# Patient Record
Sex: FEMALE | Race: WHITE | NOT HISPANIC OR LATINO | Employment: UNEMPLOYED | URBAN - METROPOLITAN AREA
[De-identification: names, ages, dates, MRNs, and addresses within clinical notes are randomized per-mention and may not be internally consistent; named-entity substitution may affect disease eponyms.]

---

## 2017-12-07 ENCOUNTER — APPOINTMENT (OUTPATIENT)
Dept: WOMENS IMAGING | Facility: HOSPITAL | Age: 52
End: 2017-12-07

## 2017-12-07 PROCEDURE — 77067 SCR MAMMO BI INCL CAD: CPT | Performed by: RADIOLOGY

## 2017-12-07 PROCEDURE — 77063 BREAST TOMOSYNTHESIS BI: CPT | Performed by: RADIOLOGY

## 2018-11-05 ENCOUNTER — HOSPITAL ENCOUNTER (EMERGENCY)
Facility: HOSPITAL | Age: 53
Discharge: HOME | End: 2018-11-05
Attending: EMERGENCY MEDICINE | Admitting: EMERGENCY MEDICINE

## 2018-11-05 ENCOUNTER — APPOINTMENT (OUTPATIENT)
Dept: GENERAL RADIOLOGY | Facility: HOSPITAL | Age: 53
End: 2018-11-05

## 2018-11-05 VITALS
BODY MASS INDEX: 24.99 KG/M2 | HEIGHT: 65 IN | DIASTOLIC BLOOD PRESSURE: 83 MMHG | TEMPERATURE: 98.1 F | WEIGHT: 150 LBS | SYSTOLIC BLOOD PRESSURE: 124 MMHG | HEART RATE: 84 BPM | OXYGEN SATURATION: 99 % | RESPIRATION RATE: 18 BRPM

## 2018-11-05 DIAGNOSIS — R07.9 CHEST PAIN, UNSPECIFIED TYPE: Primary | ICD-10-CM

## 2018-11-05 LAB
ALBUMIN SERPL-MCNC: 4.5 G/DL (ref 3.5–5.2)
ALBUMIN/GLOB SERPL: 1.6 G/DL
ALP SERPL-CCNC: 58 U/L (ref 39–117)
ALT SERPL W P-5'-P-CCNC: 24 U/L (ref 1–33)
ANION GAP SERPL CALCULATED.3IONS-SCNC: 9.9 MMOL/L
AST SERPL-CCNC: 21 U/L (ref 1–32)
BASOPHILS # BLD AUTO: 0.04 10*3/MM3 (ref 0–0.2)
BASOPHILS NFR BLD AUTO: 0.6 % (ref 0–1.5)
BILIRUB SERPL-MCNC: 0.4 MG/DL (ref 0.1–1.2)
BUN BLD-MCNC: 19 MG/DL (ref 6–20)
BUN/CREAT SERPL: 26 (ref 7–25)
CALCIUM SPEC-SCNC: 9.8 MG/DL (ref 8.6–10.5)
CHLORIDE SERPL-SCNC: 102 MMOL/L (ref 98–107)
CO2 SERPL-SCNC: 29.1 MMOL/L (ref 22–29)
CREAT BLD-MCNC: 0.73 MG/DL (ref 0.57–1)
DEPRECATED RDW RBC AUTO: 42.8 FL (ref 37–54)
EOSINOPHIL # BLD AUTO: 0.05 10*3/MM3 (ref 0–0.7)
EOSINOPHIL NFR BLD AUTO: 0.8 % (ref 0.3–6.2)
ERYTHROCYTE [DISTWIDTH] IN BLOOD BY AUTOMATED COUNT: 13.3 % (ref 11.7–13)
GFR SERPL CREATININE-BSD FRML MDRD: 83 ML/MIN/1.73
GLOBULIN UR ELPH-MCNC: 2.8 GM/DL
GLUCOSE BLD-MCNC: 101 MG/DL (ref 65–99)
HCT VFR BLD AUTO: 41.1 % (ref 35.6–45.5)
HGB BLD-MCNC: 13.4 G/DL (ref 11.9–15.5)
IMM GRANULOCYTES # BLD: 0.02 10*3/MM3 (ref 0–0.03)
IMM GRANULOCYTES NFR BLD: 0.3 % (ref 0–0.5)
LYMPHOCYTES # BLD AUTO: 1.69 10*3/MM3 (ref 0.9–4.8)
LYMPHOCYTES NFR BLD AUTO: 26.4 % (ref 19.6–45.3)
MCH RBC QN AUTO: 28.8 PG (ref 26.9–32)
MCHC RBC AUTO-ENTMCNC: 32.6 G/DL (ref 32.4–36.3)
MCV RBC AUTO: 88.4 FL (ref 80.5–98.2)
MONOCYTES # BLD AUTO: 0.39 10*3/MM3 (ref 0.2–1.2)
MONOCYTES NFR BLD AUTO: 6.1 % (ref 5–12)
NEUTROPHILS # BLD AUTO: 4.23 10*3/MM3 (ref 1.9–8.1)
NEUTROPHILS NFR BLD AUTO: 66.1 % (ref 42.7–76)
PLATELET # BLD AUTO: 197 10*3/MM3 (ref 140–500)
PMV BLD AUTO: 11.5 FL (ref 6–12)
POTASSIUM BLD-SCNC: 3.9 MMOL/L (ref 3.5–5.2)
PROT SERPL-MCNC: 7.3 G/DL (ref 6–8.5)
RBC # BLD AUTO: 4.65 10*6/MM3 (ref 3.9–5.2)
SODIUM BLD-SCNC: 141 MMOL/L (ref 136–145)
TROPONIN T SERPL-MCNC: <0.01 NG/ML (ref 0–0.03)
WBC NRBC COR # BLD: 6.4 10*3/MM3 (ref 4.5–10.7)

## 2018-11-05 PROCEDURE — 93010 ELECTROCARDIOGRAM REPORT: CPT | Performed by: INTERNAL MEDICINE

## 2018-11-05 PROCEDURE — 80053 COMPREHEN METABOLIC PANEL: CPT | Performed by: NURSE PRACTITIONER

## 2018-11-05 PROCEDURE — 93005 ELECTROCARDIOGRAM TRACING: CPT | Performed by: EMERGENCY MEDICINE

## 2018-11-05 PROCEDURE — 85025 COMPLETE CBC W/AUTO DIFF WBC: CPT | Performed by: NURSE PRACTITIONER

## 2018-11-05 PROCEDURE — 84484 ASSAY OF TROPONIN QUANT: CPT | Performed by: NURSE PRACTITIONER

## 2018-11-05 PROCEDURE — 93005 ELECTROCARDIOGRAM TRACING: CPT

## 2018-11-05 PROCEDURE — 71045 X-RAY EXAM CHEST 1 VIEW: CPT

## 2018-11-05 PROCEDURE — 99283 EMERGENCY DEPT VISIT LOW MDM: CPT

## 2018-11-05 NOTE — ED PROVIDER NOTES
" EMERGENCY DEPARTMENT ENCOUNTER    CHIEF COMPLAINT  Chief Complaint: Chest Pain  History given by: Patient  History limited by: none  Room Number: 34/34  PMD: Sid Turcios MD      HPI:  Pt is a 52 y.o. female who presents complaining of constant, non-radiating left sternal border CP that began at  yesterday. Pt reports pain was moderate last night but has eased with Ibuprofen this morning. Pt describes the pain between \"dull\" and \"throbbing\". Pt denies any aggravating factors. Pt denies fever, HA, cough, SOA, abd pain, N/V/D, burning urination, leg pain or swelling. Pt denies Hx of stress test.     Duration/Onset/Timin.5 hours, gradual, constant  Location: left to sternal border  Radiation: none   Quality: between \"throbbing\" and \"dull\"  Intensity/Severity: mild  Associated Symptoms: none  Aggravating or Alleviating Factors: no aggravating, mild relief with Ibuprofen  Previous Episodes: none      PAST MEDICAL HISTORY  Active Ambulatory Problems     Diagnosis Date Noted   • No Active Ambulatory Problems     Resolved Ambulatory Problems     Diagnosis Date Noted   • No Resolved Ambulatory Problems     Past Medical History:   Diagnosis Date   • Migraine        PAST SURGICAL HISTORY  Past Surgical History:   Procedure Laterality Date   • BREAST SURGERY      augmentation       FAMILY HISTORY  History reviewed. No pertinent family history.    SOCIAL HISTORY  Social History     Social History   • Marital status:      Spouse name: N/A   • Number of children: N/A   • Years of education: N/A     Occupational History   • Not on file.     Social History Main Topics   • Smoking status: Never Smoker   • Smokeless tobacco: Not on file   • Alcohol use No   • Drug use: Unknown   • Sexual activity: Defer     Other Topics Concern   • Not on file     Social History Narrative   • No narrative on file       ALLERGIES  Codeine    REVIEW OF SYSTEMS  Review of Systems   Constitutional: Negative.  Negative for fever. "   HENT: Negative.  Negative for sore throat.    Eyes: Negative.    Respiratory: Negative.  Negative for cough and shortness of breath.    Cardiovascular: Positive for chest pain (left sternal border). Negative for leg swelling.   Gastrointestinal: Negative.  Negative for diarrhea, nausea and vomiting.   Genitourinary: Negative.  Negative for dysuria.   Musculoskeletal: Negative.  Negative for back pain and myalgias (leg).   Skin: Negative.  Negative for rash.   Neurological: Negative.  Negative for headaches.   All other systems reviewed and are negative.      PHYSICAL EXAM  ED Triage Vitals   Temp Heart Rate Resp BP SpO2   11/05/18 1219 11/05/18 1219 11/05/18 1219 11/05/18 1256 11/05/18 1219   98.1 °F (36.7 °C) 84 18 124/83 99 %      Temp src Heart Rate Source Patient Position BP Location FiO2 (%)   11/05/18 1219 11/05/18 1219 11/05/18 1256 11/05/18 1256 --   Tympanic Monitor Sitting Right arm        Physical Exam   Constitutional: No distress.   HENT:   Head: Normocephalic and atraumatic.   Eyes: Conjunctivae are normal.   Neck: Normal range of motion.   Cardiovascular: Normal rate and regular rhythm.   Pulmonary/Chest: Breath sounds normal. No respiratory distress.   Abdominal: There is no tenderness.   Musculoskeletal: She exhibits no edema or tenderness.   Neurological: She is alert.   Skin: No rash noted.   Nursing note and vitals reviewed.      LAB RESULTS  Lab Results (last 24 hours)     Procedure Component Value Units Date/Time    CBC & Differential [[id number]] Collected:  11/05/18 1253    Specimen:  Blood Updated:  11/05/18 1320    Narrative:       The following orders were created for panel order CBC & Differential.  Procedure                               Abnormality         Status                     ---------                               -----------         ------                     CBC Auto Differential[[id number]]         Abnormal            Final result                 Please view results for these  tests on the individual orders.    Comprehensive Metabolic Panel [[id number]]  (Abnormal) Collected:  11/05/18 1253    Specimen:  Blood Updated:  11/05/18 1334     Glucose 101 (H) mg/dL      BUN 19 mg/dL      Creatinine 0.73 mg/dL      Sodium 141 mmol/L      Potassium 3.9 mmol/L      Chloride 102 mmol/L      CO2 29.1 (H) mmol/L      Calcium 9.8 mg/dL      Total Protein 7.3 g/dL      Albumin 4.50 g/dL      ALT (SGPT) 24 U/L      AST (SGOT) 21 U/L      Alkaline Phosphatase 58 U/L      Total Bilirubin 0.4 mg/dL      eGFR Non African Amer 83 mL/min/1.73      Globulin 2.8 gm/dL      A/G Ratio 1.6 g/dL      BUN/Creatinine Ratio 26.0 (H)     Anion Gap 9.9 mmol/L     Troponin [[id number]]  (Normal) Collected:  11/05/18 1253    Specimen:  Blood Updated:  11/05/18 1334     Troponin T <0.010 ng/mL     Narrative:       Troponin T Reference Ranges:  Less than 0.03 ng/mL:    Negative for AMI  0.03 to 0.09 ng/mL:      Indeterminant for AMI  Greater than 0.09 ng/mL: Positive for AMI    CBC Auto Differential [[id number]]  (Abnormal) Collected:  11/05/18 1253    Specimen:  Blood Updated:  11/05/18 1320     WBC 6.40 10*3/mm3      RBC 4.65 10*6/mm3      Hemoglobin 13.4 g/dL      Hematocrit 41.1 %      MCV 88.4 fL      MCH 28.8 pg      MCHC 32.6 g/dL      RDW 13.3 (H) %      RDW-SD 42.8 fl      MPV 11.5 fL      Platelets 197 10*3/mm3      Neutrophil % 66.1 %      Lymphocyte % 26.4 %      Monocyte % 6.1 %      Eosinophil % 0.8 %      Basophil % 0.6 %      Immature Grans % 0.3 %      Neutrophils, Absolute 4.23 10*3/mm3      Lymphocytes, Absolute 1.69 10*3/mm3      Monocytes, Absolute 0.39 10*3/mm3      Eosinophils, Absolute 0.05 10*3/mm3      Basophils, Absolute 0.04 10*3/mm3      Immature Grans, Absolute 0.02 10*3/mm3           I ordered the above labs and reviewed the results    RADIOLOGY  XR Chest 1 View   Final Result   No focal pulmonary consolidation. Follow-up as clinical   indications persist.       This report was finalized on  11/5/2018 1:15 PM by Dr. Dale Varghese M.D.               I ordered the above noted radiological studies. Interpreted by radiologist. Reviewed by me in PACS.       PROCEDURES  Procedures  EKG          EKG time: 1217  Rhythm/Rate: NSR, 70  P waves and IL: LAE  QRS, axis: Nml   ST and T waves: inverted t waves anteriorly     Interpreted Contemporaneously by me, independently viewed  No prior for comparison.       PROGRESS AND CONSULTS  ED Course as of Nov 05 1510   Mon Nov 05, 2018   1246 Cp consistent since 8pm  [KG]   1441 HEART score 2 - will contact Dr Turcios.  Pt is low risk coronary - likely outpt FU.  [DB]      ED Course User Index  [DB] Lucho Springer MD  [KG] Misti Schulte, THOMAS     HEART score = 2    1437  Initial encounter. Pt is resting comfortably. Notified pt of EKG results, unremarkable lab work, including troponin, and negative CXR. Discussed the plan to further evaluate with Cardiology. Pt understands and agrees with the plan, all questions answered.    1449  Discussed the pt's case with Dr. Turcios, Family Medicine.    1505  Rechecked pt. Pt is resting comfortably. Notified pt of discussion with Dr. Turcios. Discussed the plan to discharge the pt home. I instructed the pt to f/u with PCP and Cardiology. Pt understands and agrees with the plan, all questions answered.    I have reviewed with the patient the findings of the EKG and labs.  We have discussed that the diagnosis of chest pain is difficult and inexact at times.  We discussed inpatient vs outpatient workup and have decided on further workup as an outpatient.  It is understood that the current evaluation does not rule out 100% a serious cardiac etiology.  I have stressed the importance to return to the ER for worsening symptoms and the need to follow up promptly with the referrals I have given.      MEDICAL DECISION MAKING  Results were reviewed/discussed with the patient and they were also made aware of online access. Pt also made  aware that some labs, such as cultures, will not be resulted during ER visit and follow up with PMD is necessary.     MDM  Number of Diagnoses or Management Options  Chest pain, unspecified type:      Amount and/or Complexity of Data Reviewed  Clinical lab tests: reviewed and ordered (Lab work is unremarkable.)  Tests in the radiology section of CPT®: reviewed and ordered (CXR negative. )  Tests in the medicine section of CPT®: reviewed and ordered (See EKG results in procedure.)  Discuss the patient with other providers: yes (Dr. Turcios)           DIAGNOSIS  Final diagnoses:   Chest pain, unspecified type       DISPOSITION  DISCHARGE    Patient discharged in stable condition.    Reviewed implications of results, diagnosis, meds, responsibility to follow up, warning signs and symptoms of possible worsening, potential complications and reasons to return to ER.    Patient/Family voiced understanding of above instructions.    Discussed plan for discharge, as there is no emergent indication for admission. Patient referred to primary care provider for BP management due to today's BP. Pt/family is agreeable and understands need for follow up and repeat testing.  Pt is aware that discharge does not mean that nothing is wrong but it indicates no emergency is present that requires admission and they must continue care with follow-up as given below or physician of their choice.     FOLLOW-UP  Sid Turcios MD  [address] [facility] [address] [phone]      Call for Appointment    [facility] CARDIOLOGY  [address]  [place] [address] [phone]    Call for Appointment         Medication List      No changes were made to your prescriptions during this visit.           Latest Documented Vital Signs:  As of 3:10 PM  BP- 124/83 HR- 84 Temp- 98.1 °F (36.7 °C) (Tympanic) O2 sat- 99%    --  Documentation assistance provided by yelitza Klein for Dr. Springer.   Information recorded by the scribe was done at my direction and has been verified and validated by me.                Lul Klein  11/05/18 1510       Lucho Springer MD  11/05/18 1518    Chart addendum- please note physical exam was added from my recollection of patient at a later date, that being 4/25/2019.       Lucho Springer MD  04/25/19 0805

## 2018-12-11 ENCOUNTER — APPOINTMENT (OUTPATIENT)
Dept: WOMENS IMAGING | Facility: HOSPITAL | Age: 53
End: 2018-12-11

## 2018-12-11 PROCEDURE — 77067 SCR MAMMO BI INCL CAD: CPT | Performed by: RADIOLOGY

## 2018-12-11 PROCEDURE — 77063 BREAST TOMOSYNTHESIS BI: CPT | Performed by: RADIOLOGY

## 2019-08-05 ENCOUNTER — CONSULT (OUTPATIENT)
Dept: ORTHOPEDIC SURGERY | Facility: CLINIC | Age: 54
End: 2019-08-05

## 2019-08-05 VITALS — HEIGHT: 65 IN | WEIGHT: 155 LBS | BODY MASS INDEX: 25.83 KG/M2

## 2019-08-05 DIAGNOSIS — M25.562 ACUTE PAIN OF LEFT KNEE: Primary | ICD-10-CM

## 2019-08-05 DIAGNOSIS — M17.12 PRIMARY LOCALIZED OSTEOARTHROSIS OF LEFT LOWER LEG: ICD-10-CM

## 2019-08-05 PROCEDURE — 99203 OFFICE O/P NEW LOW 30 MIN: CPT | Performed by: ORTHOPAEDIC SURGERY

## 2019-08-05 PROCEDURE — 20610 DRAIN/INJ JOINT/BURSA W/O US: CPT | Performed by: ORTHOPAEDIC SURGERY

## 2019-08-05 PROCEDURE — 73562 X-RAY EXAM OF KNEE 3: CPT | Performed by: ORTHOPAEDIC SURGERY

## 2019-08-05 RX ORDER — MELOXICAM 15 MG/1
TABLET ORAL
Qty: 30 TABLET | Refills: 0 | Status: SHIPPED | OUTPATIENT
Start: 2019-08-05 | End: 2022-05-02 | Stop reason: ALTCHOICE

## 2019-08-05 RX ADMIN — LIDOCAINE HYDROCHLORIDE 4 ML: 20 INJECTION, SOLUTION EPIDURAL; INFILTRATION; INTRACAUDAL; PERINEURAL at 15:49

## 2019-08-05 RX ADMIN — METHYLPREDNISOLONE ACETATE 80 MG: 80 INJECTION, SUSPENSION INTRA-ARTICULAR; INTRALESIONAL; INTRAMUSCULAR; SOFT TISSUE at 15:49

## 2019-08-05 NOTE — PROGRESS NOTES
New Left Knee      Patient: Vira Lares        YOB: 1965    Medical Record Number: [id number]        Chief Complaints: left knee pain  Chief Complaint   Patient presents with   • Left Knee - Establish Care         History of Present Illness: This is a 53-year-old female who is several years status post left knee arthroscopy at which time I had to do a near subtotal meniscectomy for a large root tear she said she is actually done quite well she is able to walk however recently her last 6 to 12 months has noted some achiness in the left knee no history injury change in activity symptoms are moderate intermittent grinding with some swelling worse with walking somewhat better with 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     No current facility-administered medications on file prior to visit.      Current Medications:  Scheduled Meds:  Continuous Infusions:  No current facility-administered medications for this visit.   PRN Meds:.    Past Medical History:   Diagnosis Date   • Migraine         Past Surgical History:   Procedure Laterality Date   • BREAST SURGERY      augmentation   • KNEE SURGERY          Social History     Occupational History   • Not on file   Tobacco Use   • Smoking status: Never Smoker   Substance and Sexual Activity   • Alcohol use: No   • Drug use: Defer   • Sexual activity: Defer      Social History     Social History Narrative   • Not on file      No family history on file.          Review of Systems: 14 point review of systems are remarkable for the pertinent positives listed in the chart by the patient the remainder negative    Review of Systems      Physical Exam: 53 y.o. female  General Appearance:    " Alert, cooperative, in no acute distress                   Vitals:    08/05/19 1527   Weight: 70.3 kg (155 lb)   Height: 165.1 cm (65\")      Patient is alert and read ×3 no acute distress appears her above-listed at height weight and age.  Affect is normal respiratory rate is normal unlabored. Heart rate regular rate rhythm, sclera, dentition and hearing are normal for the purpose of this exam.        Ortho Exam Physical exam of the left knee reveals no effusion, no erythema.  It mild loss of extension and full flexion  Patient has mild varus alignment.  They have mild tenderness to palpation about the medial compartment, no tenderness laterally..  The patient has a negative bounce home, negative Jacky and a stable ligamentous exam.  Quad tone is reasonable and symmetric.  There are no overlying skin changes no lymphedema no lymphadenopathy.  There is good hip range of motion which is full symmetric and asymptomatic and a normal ankle exam.        Large Joint Arthrocentesis: L knee  Date/Time: 8/5/2019 3:49 PM  Consent given by: patient  Site marked: site marked  Timeout: Immediately prior to procedure a time out was called to verify the correct patient, procedure, equipment, support staff and site/side marked as required   Supporting Documentation  Indications: pain   Procedure Details  Location: knee - L knee  Preparation: Patient was prepped and draped in the usual sterile fashion  Needle size: 22 G  Approach: anteromedial  Medications administered: 80 mg methylPREDNISolone acetate 80 MG/ML; 4 mL lidocaine PF 2% 2 %  Patient tolerance: patient tolerated the procedure well with no immediate complications                   Radiology:   AP, Lateral and merchant views of the left knee  were ordered/reviewed to evauateknee pain.  I have no compared to films readily available she does have some narrowing of her left medial compartment about 50% of the opposite side no acute pathology  Imaging Results (most recent)  "    Procedure Component Value Units Date/Time    XR Knee 3 View Left [[id number]] Resulted:  08/05/19 1516     Updated:  08/05/19 1517    Impression:       Ordering physician's impression is located in the Encounter Note dated 08/05/19. X-ray performed in the DR room.          Assessment/Plan:        Left knee pain with some narrowing of her medial compartment.  Quite honestly I am surprised her narrowing was is not worse based on how much meniscus I had to remove I think she benefit from an injection and continued quad and core strengthening she fails to improve we would pursue other means of testing therefore the injection is diagnostic and therapeutic

## 2019-08-09 RX ORDER — METHYLPREDNISOLONE ACETATE 80 MG/ML
80 INJECTION, SUSPENSION INTRA-ARTICULAR; INTRALESIONAL; INTRAMUSCULAR; SOFT TISSUE
Status: COMPLETED | OUTPATIENT
Start: 2019-08-05 | End: 2019-08-05

## 2019-08-09 RX ORDER — LIDOCAINE HYDROCHLORIDE 20 MG/ML
4 INJECTION, SOLUTION EPIDURAL; INFILTRATION; INTRACAUDAL; PERINEURAL
Status: COMPLETED | OUTPATIENT
Start: 2019-08-05 | End: 2019-08-05

## 2019-12-12 ENCOUNTER — APPOINTMENT (OUTPATIENT)
Dept: WOMENS IMAGING | Facility: HOSPITAL | Age: 54
End: 2019-12-12

## 2019-12-12 PROCEDURE — 77067 SCR MAMMO BI INCL CAD: CPT | Performed by: RADIOLOGY

## 2019-12-12 PROCEDURE — 77063 BREAST TOMOSYNTHESIS BI: CPT | Performed by: RADIOLOGY

## 2021-04-06 ENCOUNTER — APPOINTMENT (OUTPATIENT)
Dept: WOMENS IMAGING | Facility: HOSPITAL | Age: 56
End: 2021-04-06

## 2021-04-06 PROCEDURE — 77067 SCR MAMMO BI INCL CAD: CPT | Performed by: RADIOLOGY

## 2021-04-06 PROCEDURE — 77063 BREAST TOMOSYNTHESIS BI: CPT | Performed by: RADIOLOGY

## 2022-05-02 ENCOUNTER — OFFICE VISIT (OUTPATIENT)
Dept: CARDIOLOGY | Facility: CLINIC | Age: 57
End: 2022-05-02

## 2022-05-02 VITALS
DIASTOLIC BLOOD PRESSURE: 80 MMHG | HEART RATE: 77 BPM | BODY MASS INDEX: 25.22 KG/M2 | WEIGHT: 151.4 LBS | HEIGHT: 65 IN | SYSTOLIC BLOOD PRESSURE: 130 MMHG

## 2022-05-02 DIAGNOSIS — I44.1 SYMPTOMATIC ADVANCED HEART BLOCK: Primary | ICD-10-CM

## 2022-05-02 PROCEDURE — 99204 OFFICE O/P NEW MOD 45 MIN: CPT | Performed by: INTERNAL MEDICINE

## 2022-05-02 NOTE — PROGRESS NOTES
"      CARDIOLOGY    Fermin Cantor MD    ENCOUNTER DATE:  05/02/2022    Vira Lares / 56 y.o. / female        CHIEF COMPLAINT / REASON FOR OFFICE VISIT     Abnormal stress test      HISTORY OF PRESENT ILLNESS       HPI  Vira Lares is a 56 y.o. female who presents today for consultation.  Patient had an episode where she was walking up a hill and she suddenly got very short of breath and was extremely exhausted.  Patient types of chest discomfort.  Patient says that when she exercises vigorously she will get some lightheadedness and is occasional \"spells.  She underwent a stress test in your office she walked 12 minutes with no chest discomfort or ST changes.  Patient however developed a bradycardia during exercise.  It reproduced all of her symptoms that she was having when she just got short of breath extremely exhausted.      The following portions of the patient's history were reviewed and updated as appropriate: allergies, current medications, past family history, past medical history, past social history, past surgical history and problem list.      VITAL SIGNS     Visit Vitals  /80 (BP Location: Left arm)   Pulse 77   Ht 165.1 cm (65\")   Wt 68.7 kg (151 lb 6.4 oz)   BMI 25.19 kg/m²         Wt Readings from Last 3 Encounters:   05/02/22 68.7 kg (151 lb 6.4 oz)   08/05/19 70.3 kg (155 lb)   11/05/18 68 kg (150 lb)     Body mass index is 25.19 kg/m².      REVIEW OF SYSTEMS   Review of Systems   All other systems reviewed and are negative.          PHYSICAL EXAMINATION     Vitals reviewed.   Constitutional:       Appearance: Healthy appearance.   Pulmonary:      Effort: Pulmonary effort is normal.   Cardiovascular:      Normal rate. Regular rhythm. Normal S1. Normal S2.      Murmurs: There is no murmur.      No gallop. No click. No rub.   Pulses:     Intact distal pulses.   Edema:     Peripheral edema absent.   Neurological:      Mental Status: Alert and oriented to person, place and time. "           REVIEWED DATA     Procedures    Cardiac Procedures:  1.           ASSESSMENT & PLAN      Diagnosis Plan   1. Symptomatic advanced heart block           SUMMARY/DISCUSSION  1. Patient was found to have exercise-induced heart block on a stress test.  Patient had an echocardiogram that was normal.  She had trace much regurgitation and ejection fraction measured at 67%.  Patient is very active she has no other symptoms and she has no past medical history.  With that it appears she has exercise-induced heart block.  We will set her up to get a pacemaker with Dr. Sid Keller.  We will retry for next Tuesday if it is okay with her schedule.        MEDICATIONS         Discharge Medications      as of May 2, 2022 12:54 PM     Patient Not Prescribed Medications Upon Discharge             **Dragon Disclaimer:   Much of this encounter note is an electronic transcription/translation of spoken language to printed text. The electronic translation of spoken language may permit erroneous, or at times, nonsensical words or phrases to be inadvertently transcribed. Although I have reviewed the note for such errors, some may still exist.

## 2022-05-04 DIAGNOSIS — I44.1 AV BLOCK, MOBITZ II: Primary | ICD-10-CM

## 2022-05-13 ENCOUNTER — TRANSCRIBE ORDERS (OUTPATIENT)
Dept: CARDIOLOGY | Facility: CLINIC | Age: 57
End: 2022-05-13

## 2022-05-13 DIAGNOSIS — Z01.810 PREPROCEDURAL CARDIOVASCULAR EXAMINATION: ICD-10-CM

## 2022-05-13 DIAGNOSIS — Z13.6 SCREENING FOR CARDIOVASCULAR CONDITION: Primary | ICD-10-CM

## 2022-05-23 ENCOUNTER — HOSPITAL ENCOUNTER (OUTPATIENT)
Dept: MRI IMAGING | Facility: HOSPITAL | Age: 57
Discharge: HOME | End: 2022-05-23
Admitting: INTERNAL MEDICINE

## 2022-05-23 PROCEDURE — 75561 CARDIAC MRI FOR MORPH W/DYE: CPT

## 2022-05-23 PROCEDURE — 75561 CARDIAC MRI FOR MORPH W/DYE: CPT | Performed by: INTERNAL MEDICINE

## 2022-05-23 PROCEDURE — 0 GADOBENATE DIMEGLUMINE 529 MG/ML SOLUTION: Performed by: INTERNAL MEDICINE

## 2022-05-23 PROCEDURE — A9577 INJ MULTIHANCE: HCPCS | Performed by: INTERNAL MEDICINE

## 2022-05-23 RX ADMIN — GADOBENATE DIMEGLUMINE 13 ML: 529 INJECTION, SOLUTION INTRAVENOUS at 10:36

## 2022-05-27 NOTE — PROGRESS NOTES
I left her a voicemail with these results and still recommended a pacemaker.  I also left a voicemail for her internal medicine physician Dr. Sid Turcios

## 2022-07-22 ENCOUNTER — APPOINTMENT (OUTPATIENT)
Dept: WOMENS IMAGING | Facility: HOSPITAL | Age: 57
End: 2022-07-22

## 2022-07-22 PROCEDURE — 77063 BREAST TOMOSYNTHESIS BI: CPT | Performed by: RADIOLOGY

## 2022-07-22 PROCEDURE — 77067 SCR MAMMO BI INCL CAD: CPT | Performed by: RADIOLOGY

## 2025-03-21 ENCOUNTER — OFFICE VISIT (OUTPATIENT)
Age: 60
End: 2025-03-21
Payer: COMMERCIAL

## 2025-03-21 VITALS
DIASTOLIC BLOOD PRESSURE: 80 MMHG | HEIGHT: 65 IN | SYSTOLIC BLOOD PRESSURE: 134 MMHG | BODY MASS INDEX: 22.33 KG/M2 | HEART RATE: 54 BPM | WEIGHT: 134 LBS

## 2025-03-21 DIAGNOSIS — I44.1 2ND DEGREE AV BLOCK: Primary | ICD-10-CM

## 2025-03-21 NOTE — PROGRESS NOTES
"Date of Office Visit: 2025  Encounter Provider: Sid Keller MD  Place of Service: [facility] CARDIOLOGY  Patient Name: Vira Lares  : 1965    Subjective:     Encounter Date:2025      Patient ID: Vira Lares is a 59 y.o. female who has a cc of  exercise induced AV block and I rec a pacer in . She declined and it got better.     Now with severe SHAW and \"hitting a wall\" while walking up hill.     The patient had a good year.   No anginal chest pain,   No sig shaw,   No soa,   No fainting,  No orthostasis.   No edema.   Exercise tolerance: decreased     There have been no hospital admission since the last visit.     There have been no bleeding events.       Past Medical History:   Diagnosis Date    Migraine        Social History     Socioeconomic History    Marital status:    Tobacco Use    Smoking status: Never     Passive exposure: Never    Smokeless tobacco: Never    Tobacco comments:     caffiene use 2 cups coffee daily   Vaping Use    Vaping status: Never Used   Substance and Sexual Activity    Alcohol use: Yes     Alcohol/week: 10.0 standard drinks of alcohol     Types: 10 Glasses of wine per week     Comment: 4 glasses wine weekly    Drug use: Never    Sexual activity: Not Currently     Partners: Male     Birth control/protection: Post-menopausal       Family History   Problem Relation Age of Onset    Cancer Paternal Aunt     Cancer Paternal Grandmother     Sudden death Paternal Grandfather         MI late 70s       Review of Systems   Constitutional: Negative for fever and night sweats.   HENT:  Negative for ear pain and stridor.    Eyes:  Negative for discharge and visual halos.   Cardiovascular:  Negative for cyanosis.   Respiratory:  Negative for hemoptysis and sputum production.    Hematologic/Lymphatic: Negative for adenopathy.   Skin:  Negative for nail changes and unusual hair distribution.   Musculoskeletal:  Negative for gout and joint swelling. " "  Gastrointestinal:  Negative for bowel incontinence and flatus.   Genitourinary:  Negative for dysuria and flank pain.   Neurological:  Negative for seizures and tremors.   Psychiatric/Behavioral:  Negative for altered mental status. The patient is not nervous/anxious.             Objective:     Vitals:    03/21/25 1117   BP: 134/80   BP Location: Left arm   Patient Position: Sitting   Cuff Size: Adult   Pulse: 54   Weight: 60.8 kg (134 lb)   Height: 165.1 cm (65\")         Eyes:      General:         Right eye: No discharge.         Left eye: No discharge.   HENT:      Head: Normocephalic and atraumatic.   Neck:      Thyroid: No thyromegaly.      Vascular: No JVD.   Pulmonary:      Effort: Pulmonary effort is normal.      Breath sounds: Normal breath sounds. No rales.   Cardiovascular:      Normal rate. Regular rhythm.      No gallop.    Edema:     Peripheral edema absent.   Abdominal:      General: Bowel sounds are normal.      Palpations: Abdomen is soft.      Tenderness: There is no abdominal tenderness.   Musculoskeletal: Normal range of motion.         General: No deformity. Skin:     General: Skin is warm and dry.      Findings: No erythema.   Neurological:      Mental Status: Alert and oriented to person, place, and time.      Motor: Normal muscle tone.   Psychiatric:         Behavior: Behavior normal.         Thought Content: Thought content normal.           ECG 12 Lead    Date/Time: 3/21/2025 11:59 AM  Performed by: Sid Keller MD    Authorized by: Sid Keller MD  Comparison: compared with previous ECG   Similar to previous ECG  Rhythm: sinus rhythm  Conduction: right bundle branch block          Lab Review:       Assessment:          Diagnosis Plan   1. 2nd degree AV block               Plan:     She has worse symptoms and I think we should do a pacer. Risks discussed.     I rec it in 2022 and more strongly now.     She has clear infra-elliott and symptomatic AV block.     MRI negative in 2022. " When she had the same problem

## 2025-04-15 ENCOUNTER — TELEPHONE (OUTPATIENT)
Age: 60
End: 2025-04-15
Payer: COMMERCIAL

## 2025-04-15 NOTE — TELEPHONE ENCOUNTER
Received message regarding pt not being able to get pre-op labs done the week prior to procedure.    Will one of you ladies please contact pt and address this matter?!    Thanks,  BOYD Ludwig

## 2025-04-17 ENCOUNTER — RESULTS FOLLOW-UP (OUTPATIENT)
Age: 60
End: 2025-04-17
Payer: COMMERCIAL

## 2025-04-17 ENCOUNTER — LAB (OUTPATIENT)
Facility: HOSPITAL | Age: 60
End: 2025-04-17
Payer: COMMERCIAL

## 2025-04-17 DIAGNOSIS — I44.1 2ND DEGREE AV BLOCK: Primary | ICD-10-CM

## 2025-04-17 DIAGNOSIS — I44.1 2ND DEGREE AV BLOCK: ICD-10-CM

## 2025-04-17 LAB
ANION GAP SERPL CALCULATED.3IONS-SCNC: 11.9 MMOL/L (ref 5–15)
BUN SERPL-MCNC: 21 MG/DL (ref 8–23)
BUN/CREAT SERPL: 27.3 (ref 7–25)
CALCIUM SPEC-SCNC: 9.9 MG/DL (ref 8.6–10.5)
CHLORIDE SERPL-SCNC: 103 MMOL/L (ref 98–107)
CO2 SERPL-SCNC: 27.1 MMOL/L (ref 22–29)
CREAT SERPL-MCNC: 0.77 MG/DL (ref 0.57–1)
DEPRECATED RDW RBC AUTO: 40.6 FL (ref 37–54)
EGFRCR SERPLBLD CKD-EPI 2021: 88.4 ML/MIN/1.73
ERYTHROCYTE [DISTWIDTH] IN BLOOD BY AUTOMATED COUNT: 12.4 % (ref 12.3–15.4)
GLUCOSE SERPL-MCNC: 92 MG/DL (ref 65–99)
HCT VFR BLD AUTO: 40.6 % (ref 34–46.6)
HGB BLD-MCNC: 13.5 G/DL (ref 12–15.9)
MCH RBC QN AUTO: 29.5 PG (ref 26.6–33)
MCHC RBC AUTO-ENTMCNC: 33.3 G/DL (ref 31.5–35.7)
MCV RBC AUTO: 88.8 FL (ref 79–97)
PLATELET # BLD AUTO: 174 10*3/MM3 (ref 140–450)
PMV BLD AUTO: 12.1 FL (ref 6–12)
POTASSIUM SERPL-SCNC: 4.6 MMOL/L (ref 3.5–5.2)
RBC # BLD AUTO: 4.57 10*6/MM3 (ref 3.77–5.28)
SODIUM SERPL-SCNC: 142 MMOL/L (ref 136–145)
WBC NRBC COR # BLD AUTO: 4.55 10*3/MM3 (ref 3.4–10.8)

## 2025-04-17 PROCEDURE — 80048 BASIC METABOLIC PNL TOTAL CA: CPT

## 2025-04-17 PROCEDURE — 36415 COLL VENOUS BLD VENIPUNCTURE: CPT

## 2025-04-17 PROCEDURE — 85027 COMPLETE CBC AUTOMATED: CPT

## 2025-04-21 ENCOUNTER — TELEPHONE (OUTPATIENT)
Age: 60
End: 2025-04-21
Payer: COMMERCIAL

## 2025-04-22 ENCOUNTER — HOSPITAL ENCOUNTER (OUTPATIENT)
Facility: HOSPITAL | Age: 60
Setting detail: HOSPITAL OUTPATIENT SURGERY
Discharge: HOME | End: 2025-04-22
Attending: INTERNAL MEDICINE | Admitting: INTERNAL MEDICINE
Payer: COMMERCIAL

## 2025-04-22 VITALS
OXYGEN SATURATION: 100 % | DIASTOLIC BLOOD PRESSURE: 70 MMHG | TEMPERATURE: 97.2 F | SYSTOLIC BLOOD PRESSURE: 114 MMHG | BODY MASS INDEX: 21.49 KG/M2 | HEART RATE: 55 BPM | HEIGHT: 65 IN | RESPIRATION RATE: 18 BRPM | WEIGHT: 129 LBS

## 2025-04-22 DIAGNOSIS — I44.1 2ND DEGREE AV BLOCK: ICD-10-CM

## 2025-04-22 LAB
QT INTERVAL: 429 MS
QT INTERVAL: 439 MS
QTC INTERVAL: 411 MS
QTC INTERVAL: 413 MS

## 2025-04-22 PROCEDURE — C1894 INTRO/SHEATH, NON-LASER: HCPCS | Performed by: INTERNAL MEDICINE

## 2025-04-22 PROCEDURE — C1769 GUIDE WIRE: HCPCS | Performed by: INTERNAL MEDICINE

## 2025-04-22 PROCEDURE — 33208 INSRT HEART PM ATRIAL & VENT: CPT | Performed by: INTERNAL MEDICINE

## 2025-04-22 PROCEDURE — 25010000002 FENTANYL CITRATE (PF) 50 MCG/ML SOLUTION: Performed by: INTERNAL MEDICINE

## 2025-04-22 PROCEDURE — 93010 ELECTROCARDIOGRAM REPORT: CPT | Performed by: INTERNAL MEDICINE

## 2025-04-22 PROCEDURE — C1887 CATHETER, GUIDING: HCPCS | Performed by: INTERNAL MEDICINE

## 2025-04-22 PROCEDURE — C1785 PMKR, DUAL, RATE-RESP: HCPCS | Performed by: INTERNAL MEDICINE

## 2025-04-22 PROCEDURE — 25010000002 MIDAZOLAM PER 1 MG: Performed by: INTERNAL MEDICINE

## 2025-04-22 PROCEDURE — 25810000003 SODIUM CHLORIDE 0.9 % SOLUTION 250 ML FLEX CONT: Performed by: INTERNAL MEDICINE

## 2025-04-22 PROCEDURE — 93005 ELECTROCARDIOGRAM TRACING: CPT | Performed by: INTERNAL MEDICINE

## 2025-04-22 PROCEDURE — 25010000002 VANCOMYCIN 1 G RECONSTITUTED SOLUTION 1 EACH VIAL: Performed by: INTERNAL MEDICINE

## 2025-04-22 PROCEDURE — S0260 H&P FOR SURGERY: HCPCS | Performed by: INTERNAL MEDICINE

## 2025-04-22 PROCEDURE — 25010000002 LIDOCAINE-EPINEPHRINE (PF) 1 %-1:200000 SOLUTION: Performed by: INTERNAL MEDICINE

## 2025-04-22 PROCEDURE — C1898 LEAD, PMKR, OTHER THAN TRANS: HCPCS | Performed by: INTERNAL MEDICINE

## 2025-04-22 PROCEDURE — 25810000003 SODIUM CHLORIDE 0.9 % SOLUTION: Performed by: INTERNAL MEDICINE

## 2025-04-22 DEVICE — LD PM CAPSUREFIX NOVUS IS1 MEDTR 407652: Type: IMPLANTABLE DEVICE | Status: FUNCTIONAL

## 2025-04-22 DEVICE — GEN PM AZURE XT SURESCAN DR MRI: Type: IMPLANTABLE DEVICE | Status: FUNCTIONAL

## 2025-04-22 RX ORDER — SODIUM CHLORIDE 9 MG/ML
75 INJECTION, SOLUTION INTRAVENOUS CONTINUOUS
Status: DISCONTINUED | OUTPATIENT
Start: 2025-04-22 | End: 2025-04-22 | Stop reason: HOSPADM

## 2025-04-22 RX ORDER — FENTANYL CITRATE 50 UG/ML
INJECTION, SOLUTION INTRAMUSCULAR; INTRAVENOUS
Status: DISCONTINUED | OUTPATIENT
Start: 2025-04-22 | End: 2025-04-22 | Stop reason: HOSPADM

## 2025-04-22 RX ORDER — MIDAZOLAM HYDROCHLORIDE 1 MG/ML
INJECTION, SOLUTION INTRAMUSCULAR; INTRAVENOUS
Status: DISCONTINUED | OUTPATIENT
Start: 2025-04-22 | End: 2025-04-22 | Stop reason: HOSPADM

## 2025-04-22 RX ORDER — SODIUM CHLORIDE 0.9 % (FLUSH) 0.9 %
10 SYRINGE (ML) INJECTION AS NEEDED
Status: DISCONTINUED | OUTPATIENT
Start: 2025-04-22 | End: 2025-04-22 | Stop reason: HOSPADM

## 2025-04-22 RX ORDER — METHOHEXITAL IN WATER/PF 100MG/10ML
SYRINGE (ML) INTRAVENOUS
Status: DISCONTINUED | OUTPATIENT
Start: 2025-04-22 | End: 2025-04-22 | Stop reason: HOSPADM

## 2025-04-22 RX ORDER — NITROGLYCERIN 0.4 MG/1
0.4 TABLET SUBLINGUAL
Status: DISCONTINUED | OUTPATIENT
Start: 2025-04-22 | End: 2025-04-22 | Stop reason: HOSPADM

## 2025-04-22 RX ADMIN — SODIUM CHLORIDE 75 ML/HR: 9 INJECTION, SOLUTION INTRAVENOUS at 12:21

## 2025-04-22 RX ADMIN — SODIUM CHLORIDE 1000 MG: 9 INJECTION, SOLUTION INTRAVENOUS at 12:22

## 2025-04-22 NOTE — DISCHARGE INSTRUCTIONS
"[facility]   [phone]    Post Pacemaker / Defibrillator Implant Instructions      The dressing may be removed the next day.    If steri-strips were used, they should not be removed. Allow them to \"fall off\".      You may shower after the dressing is removed. Do not allow shower water to hit directly on incision.    No lotion/powder/ointment/cream on incision until it is healed.    Gently wash incision daily with soap and water and pat dry.    You may reapply a dressing if there is drainage, otherwise leave your incision open to air. If you reapply a dressing, please notify the pacemaker clinic.    No heavy lifting, pulling, or pushing.    Do not raise the affected arm over your head for a minimum of 1 month.    The pacemaker clinic will contact you (usually within 1 business day) to schedule a pacemaker/incision check. The check is usually done 7-10 days post-implant. If you have not heard from the pacemaker clinic within 3 days, please call the office.    No driving until seen at your follow up appointment    Please call the office if you experience any of the following:   bleeding or drainage from your incision   swelling, redness, or opening of your incision   fever or chills   pain not relieved with medication   chest pain or difficulty breathing   lightheadness    "

## 2025-04-24 NOTE — H&P
"Vira Lares is a 59 y.o. female who has a cc of  exercise induced AV block and I rec a pacer in 2022. She declined and it got better.     Now with severe SHAW and \"hitting a wall\" while walking up hill.      The patient had a good year.   No anginal chest pain,   No sig shaw,   No soa,   No fainting,  No orthostasis.   No edema.   Exercise tolerance: decreased     There have been no hospital admission since the last visit.      There have been no bleeding events.         Medical History        Past Medical History:   Diagnosis Date    Migraine              Social History   Social History            Socioeconomic History    Marital status:    Tobacco Use    Smoking status: Never       Passive exposure: Never    Smokeless tobacco: Never    Tobacco comments:       caffiene use 2 cups coffee daily   Vaping Use    Vaping status: Never Used   Substance and Sexual Activity    Alcohol use: Yes       Alcohol/week: 10.0 standard drinks of alcohol       Types: 10 Glasses of wine per week       Comment: 4 glasses wine weekly    Drug use: Never    Sexual activity: Not Currently       Partners: Male       Birth control/protection: Post-menopausal                  Family History   Problem Relation Age of Onset    Cancer Paternal Aunt      Cancer Paternal Grandmother      Sudden death Paternal Grandfather           MI late 70s         Review of Systems   Constitutional: Negative for fever and night sweats.   HENT:  Negative for ear pain and stridor.    Eyes:  Negative for discharge and visual halos.   Cardiovascular:  Negative for cyanosis.   Respiratory:  Negative for hemoptysis and sputum production.    Hematologic/Lymphatic: Negative for adenopathy.   Skin:  Negative for nail changes and unusual hair distribution.   Musculoskeletal:  Negative for gout and joint swelling.   Gastrointestinal:  Negative for bowel incontinence and flatus.   Genitourinary:  Negative for dysuria and flank pain.   Neurological:  Negative for " "seizures and tremors.   Psychiatric/Behavioral:  Negative for altered mental status. The patient is not nervous/anxious.                Objective:      Vitals       Vitals:     03/21/25 1117   BP: 134/80   BP Location: Left arm   Patient Position: Sitting   Cuff Size: Adult   Pulse: 54   Weight: 60.8 kg (134 lb)   Height: 165.1 cm (65\")            Objective  Eyes:      General:         Right eye: No discharge.         Left eye: No discharge.   HENT:      Head: Normocephalic and atraumatic.   Neck:      Thyroid: No thyromegaly.      Vascular: No JVD.   Pulmonary:      Effort: Pulmonary effort is normal.      Breath sounds: Normal breath sounds. No rales.   Cardiovascular:      Normal rate. Regular rhythm.      No gallop.    Edema:     Peripheral edema absent.   Abdominal:      General: Bowel sounds are normal.      Palpations: Abdomen is soft.      Tenderness: There is no abdominal tenderness.   Musculoskeletal: Normal range of motion.         General: No deformity. Skin:     General: Skin is warm and dry.      Findings: No erythema.   Neurological:      Mental Status: Alert and oriented to person, place, and time.      Motor: Normal muscle tone.   Psychiatric:         Behavior: Behavior normal.         Thought Content: Thought content normal.               ECG 12 Lead     Date/Time: 3/21/2025 11:59 AM  Performed by: Sid Keller MD     Authorized by: Sid Keller MD  Comparison: compared with previous ECG   Similar to previous ECG  Rhythm: sinus rhythm  Conduction: right bundle branch block              Lab Review:         Assessment:      Assessment    Diagnosis Plan   1. 2nd degree AV block                   Plan:      She has worse symptoms and I think we should do a pacer. Risks discussed.      I rec it in 2022 and more strongly now.     She has clear infra-elliott and symptomatic AV block.      MRI negative in 2022. When she had the same problem               "

## 2025-04-29 ENCOUNTER — CLINICAL SUPPORT NO REQUIREMENTS (OUTPATIENT)
Age: 60
End: 2025-04-29
Payer: COMMERCIAL

## 2025-04-29 DIAGNOSIS — I44.1 2ND DEGREE AV BLOCK: Primary | ICD-10-CM

## 2025-05-08 ENCOUNTER — CLINICAL SUPPORT NO REQUIREMENTS (OUTPATIENT)
Age: 60
End: 2025-05-08
Payer: COMMERCIAL

## 2025-05-08 DIAGNOSIS — I44.1 2ND DEGREE AV BLOCK: Primary | ICD-10-CM

## 2025-05-28 ENCOUNTER — TELEPHONE (OUTPATIENT)
Dept: CARDIOLOGY | Age: 60
End: 2025-05-28
Payer: COMMERCIAL

## 2025-05-28 NOTE — TELEPHONE ENCOUNTER
Caller: Vira Lares    Relationship: Self    Best call back number: [phone]    What is the best time to reach you: ANYTIME    Who are you requesting to speak with (clinical staff, provider,  specific staff member): CLINICAL        What was the call regarding: PT HAD PACEMAKER PUT IN 04/22/2025. CAN SHE START UPPER BODY WORKOUTS NOW?  PLEASE CALL TO ADVISE

## 2025-07-18 ENCOUNTER — CLINICAL SUPPORT NO REQUIREMENTS (OUTPATIENT)
Age: 60
End: 2025-07-18
Payer: COMMERCIAL

## 2025-07-18 ENCOUNTER — OFFICE VISIT (OUTPATIENT)
Age: 60
End: 2025-07-18
Payer: COMMERCIAL

## 2025-07-18 VITALS
DIASTOLIC BLOOD PRESSURE: 70 MMHG | SYSTOLIC BLOOD PRESSURE: 104 MMHG | HEIGHT: 65 IN | HEART RATE: 64 BPM | BODY MASS INDEX: 21.83 KG/M2 | WEIGHT: 131 LBS

## 2025-07-18 DIAGNOSIS — I44.1 2ND DEGREE AV BLOCK: Primary | ICD-10-CM

## 2025-07-18 NOTE — PROGRESS NOTES
Date of Office Visit: 2025  Encounter Provider: THOMAS Patton  Place of Service: [facility] CARDIOLOGY  Patient Name: Vira Lares  :1965    Chief Complaint   Patient presents with    2nd degree heart block    Pacemaker Check   :     HPI: Vira Lares is a 59 y.o. female who follows with Dr. Keller. She has AV block---s/p DC PPM.     She had some dizziness in . Holter showed AV block.     Dr. Keller recommended pacemaker but she ended up feeling better and declined.     Earlier this year she was having worsening dyspnea on exertion and low power. Pacemaker was recommended.     She underwent DC LB pacemaker with Dr. Keller on 2025.               She presents today for follow up appt.     Since pacemaker placement she has been doing great.     She no longer has any symptoms.     Normal device testing and function in office today. : 100%. Dependent today.     EKG shows paced, Lb capture.     She has no complaints or concerns.     Incision is well healed.     Past Medical History:   Diagnosis Date    Migraine        Past Surgical History:   Procedure Laterality Date    BREAST SURGERY      augmentation    CARDIAC ELECTROPHYSIOLOGY PROCEDURE N/A 2025    Procedure: Pacemaker DC new [employer];  Surgeon: Sid Keller MD;  Location: [facility] INVASIVE LOCATION;  Service: Cardiovascular;  Laterality: N/A;  [employer]    KNEE SURGERY         Social History     Socioeconomic History    Marital status:    Tobacco Use    Smoking status: Never     Passive exposure: Never    Smokeless tobacco: Never    Tobacco comments:     caffiene use 2 cups coffee daily   Vaping Use    Vaping status: Never Used   Substance and Sexual Activity    Alcohol use: Yes     Alcohol/week: 7.0 standard drinks of alcohol     Types: 7 Glasses of wine per week     Comment: 7 glasses wine weekly    Drug use: Never    Sexual activity: Defer     Partners: Male     Birth  "control/protection: Post-menopausal       Family History   Problem Relation Age of Onset    Cancer Paternal Aunt     Cancer Paternal Grandmother     Sudden death Paternal Grandfather         MI late 70s       Review of Systems   Constitutional: Negative for chills, fever and malaise/fatigue.   Cardiovascular:  Negative for chest pain, dyspnea on exertion, leg swelling, near-syncope, orthopnea, palpitations, paroxysmal nocturnal dyspnea and syncope.   Respiratory:  Negative for cough and shortness of breath.    Hematologic/Lymphatic: Negative.    Musculoskeletal:  Negative for joint pain, joint swelling and myalgias.   Gastrointestinal:  Negative for abdominal pain, diarrhea, melena, nausea and vomiting.   Genitourinary:  Negative for frequency and hematuria.   Neurological:  Negative for light-headedness, numbness, paresthesias and seizures.   Allergic/Immunologic: Negative.    All other systems reviewed and are negative.      Allergies   Allergen Reactions    Codeine Nausea Only         Current Outpatient Medications:     Unable to find, 1 each 1 (One) Time. Med Name: CBD GUMMIES AT NIGHT PRN, Disp: , Rfl:       Objective:     Vitals:    07/18/25 1026   BP: 104/70   BP Location: Right arm   Patient Position: Sitting   Cuff Size: Adult   Pulse: 64   Weight: 59.4 kg (131 lb)   Height: 165.1 cm (65\")     Body mass index is 21.8 kg/m².    PHYSICAL EXAM:    Vitals Reviewed.   General Appearance: No acute distress, well developed and well nourished.   Respiratory: No signs of respiratory distress. Respiration rhythm and depth normal.   Cardiovascular:  Heart Rate and Rhythm: paced   Skin: Warm and dry.   Psychiatric: Patient alert and oriented to person, place, and time. Speech and behavior appropriate. Normal mood and affect.       ECG 12 Lead    Date/Time: 7/18/2025 11:10 AM  Performed by: Abdirahman Stewart APRN    Authorized by: Abdirahman Stewart APRN  Comparison: compared with previous ECG   Similar to previous " ECG  Rhythm: paced            Assessment:       Diagnosis Plan   1. 2nd degree AV block               Plan:   AV block----s/p DC PPM---- she has done great since pacemaker placement. She no longer has any symptoms. Normal device testing and function in office today, : 100%. She is dependent.                     Follow up with device check in one year.                       I spent at least 30 minutes reviewing previous notes, labs, EKGs, device reports and/or time with the patient.             As always, it has been a pleasure to participate in your patient's care.      Sincerely,         THOMAS Patton

## 2025-07-25 LAB
MC_CV_MDC_IDC_RATE_1: 194
MC_CV_MDC_IDC_ZONE_ID: 0
MDC_IDC_MSMT_BATTERY_RRT_TRIGGER: 2.63
MDC_IDC_MSMT_BATTERY_STATUS: NORMAL
MDC_IDC_MSMT_BATTERY_VOLTAGE: 3.19
MDC_IDC_MSMT_LEADCHNL_RA_DTM: NORMAL
MDC_IDC_MSMT_LEADCHNL_RA_IMPEDANCE_VALUE: 437
MDC_IDC_MSMT_LEADCHNL_RA_PACING_THRESHOLD_AMPLITUDE: 0.5
MDC_IDC_MSMT_LEADCHNL_RA_PACING_THRESHOLD_PULSEWIDTH: 0.4
MDC_IDC_MSMT_LEADCHNL_RA_SENSING_INTR_AMPL: 2.8
MDC_IDC_MSMT_LEADCHNL_RV_DTM: NORMAL
MDC_IDC_MSMT_LEADCHNL_RV_IMPEDANCE_VALUE: 475
MDC_IDC_MSMT_LEADCHNL_RV_PACING_THRESHOLD_AMPLITUDE: 0.5
MDC_IDC_MSMT_LEADCHNL_RV_PACING_THRESHOLD_PULSEWIDTH: 0.4
MDC_IDC_MSMT_LEADCHNL_RV_SENSING_INTR_AMPL: 16
MDC_IDC_PG_IMPLANT_DTM: NORMAL
MDC_IDC_PG_MFG: NORMAL
MDC_IDC_PG_MODEL: NORMAL
MDC_IDC_PG_SERIAL: NORMAL
MDC_IDC_PG_TYPE: NORMAL
MDC_IDC_SESS_DTM: NORMAL
MDC_IDC_SET_BRADY_AT_MODE_SWITCH_RATE: 176
MDC_IDC_SET_BRADY_LOWRATE: 50
MDC_IDC_SET_BRADY_MAX_SENSOR_RATE: 130
MDC_IDC_SET_BRADY_MAX_TRACKING_RATE: 170
MDC_IDC_SET_BRADY_MODE: NORMAL
MDC_IDC_SET_BRADY_PAV_DELAY: 130
MDC_IDC_SET_BRADY_SAV_DELAY: 80
MDC_IDC_SET_LEADCHNL_RA_PACING_AMPLITUDE: 3.5
MDC_IDC_SET_LEADCHNL_RA_PACING_PULSEWIDTH: 0.4
MDC_IDC_SET_LEADCHNL_RA_SENSING_SENSITIVITY: 0.3
MDC_IDC_SET_LEADCHNL_RV_PACING_AMPLITUDE: 3.5
MDC_IDC_SET_LEADCHNL_RV_PACING_PULSEWIDTH: 0.4
MDC_IDC_SET_LEADCHNL_RV_SENSING_SENSITIVITY: 4
MDC_IDC_SET_ZONE_STATUS: NORMAL
MDC_IDC_SET_ZONE_TYPE: NORMAL
MDC_IDC_STAT_AT_BURDEN_PERCENT: 0
MDC_IDC_STAT_BRADY_RA_PERCENT_PACED: 0.3
MDC_IDC_STAT_BRADY_RV_PERCENT_PACED: 99.9

## 2025-07-30 LAB
MC_CV_MDC_IDC_RATE_1: 176
MC_CV_MDC_IDC_RATE_1: 194
MC_CV_MDC_IDC_THERAPIES: NORMAL
MC_CV_MDC_IDC_ZONE_ID: 2
MC_CV_MDC_IDC_ZONE_ID: 6
MDC_IDC_MSMT_BATTERY_REMAINING_LONGEVITY: 132 MO
MDC_IDC_MSMT_BATTERY_RRT_TRIGGER: 2.62
MDC_IDC_MSMT_BATTERY_STATUS: NORMAL
MDC_IDC_MSMT_BATTERY_VOLTAGE: 3.18
MDC_IDC_MSMT_LEADCHNL_RA_DTM: NORMAL
MDC_IDC_MSMT_LEADCHNL_RA_IMPEDANCE_VALUE: 418
MDC_IDC_MSMT_LEADCHNL_RA_PACING_THRESHOLD_AMPLITUDE: 0.5
MDC_IDC_MSMT_LEADCHNL_RA_PACING_THRESHOLD_POLARITY: NORMAL
MDC_IDC_MSMT_LEADCHNL_RA_PACING_THRESHOLD_PULSEWIDTH: 0.4
MDC_IDC_MSMT_LEADCHNL_RA_SENSING_INTR_AMPL: 2.75
MDC_IDC_MSMT_LEADCHNL_RV_DTM: NORMAL
MDC_IDC_MSMT_LEADCHNL_RV_IMPEDANCE_VALUE: 399
MDC_IDC_MSMT_LEADCHNL_RV_PACING_THRESHOLD_AMPLITUDE: 0.5
MDC_IDC_MSMT_LEADCHNL_RV_PACING_THRESHOLD_POLARITY: NORMAL
MDC_IDC_MSMT_LEADCHNL_RV_PACING_THRESHOLD_PULSEWIDTH: 0.4
MDC_IDC_MSMT_LEADCHNL_RV_SENSING_INTR_AMPL: 17
MDC_IDC_PG_IMPLANT_DTM: NORMAL
MDC_IDC_PG_MFG: NORMAL
MDC_IDC_PG_MODEL: NORMAL
MDC_IDC_PG_SERIAL: NORMAL
MDC_IDC_PG_TYPE: NORMAL
MDC_IDC_SESS_DTM: NORMAL
MDC_IDC_SESS_TYPE: NORMAL
MDC_IDC_SET_BRADY_AT_MODE_SWITCH_RATE: 176
MDC_IDC_SET_BRADY_LOWRATE: 50
MDC_IDC_SET_BRADY_MAX_SENSOR_RATE: 130
MDC_IDC_SET_BRADY_MAX_TRACKING_RATE: 170
MDC_IDC_SET_BRADY_MODE: NORMAL
MDC_IDC_SET_BRADY_PAV_DELAY: 140
MDC_IDC_SET_BRADY_SAV_DELAY: 120
MDC_IDC_SET_LEADCHNL_RA_PACING_AMPLITUDE: 3.5
MDC_IDC_SET_LEADCHNL_RA_PACING_POLARITY: NORMAL
MDC_IDC_SET_LEADCHNL_RA_PACING_PULSEWIDTH: 0.4
MDC_IDC_SET_LEADCHNL_RA_SENSING_POLARITY: NORMAL
MDC_IDC_SET_LEADCHNL_RA_SENSING_SENSITIVITY: 0.3
MDC_IDC_SET_LEADCHNL_RV_PACING_AMPLITUDE: 3.5
MDC_IDC_SET_LEADCHNL_RV_PACING_POLARITY: NORMAL
MDC_IDC_SET_LEADCHNL_RV_PACING_PULSEWIDTH: 0.4
MDC_IDC_SET_LEADCHNL_RV_SENSING_POLARITY: NORMAL
MDC_IDC_SET_LEADCHNL_RV_SENSING_SENSITIVITY: 4
MDC_IDC_SET_ZONE_STATUS: NORMAL
MDC_IDC_SET_ZONE_STATUS: NORMAL
MDC_IDC_SET_ZONE_TYPE: NORMAL
MDC_IDC_SET_ZONE_TYPE: NORMAL
MDC_IDC_STAT_AT_BURDEN_PERCENT: 0
MDC_IDC_STAT_BRADY_RA_PERCENT_PACED: 0.4
MDC_IDC_STAT_BRADY_RV_PERCENT_PACED: 99.89

## 2025-08-06 LAB
MC_CV_MDC_IDC_RATE_1: 176
MC_CV_MDC_IDC_RATE_1: 194
MC_CV_MDC_IDC_THERAPIES: NORMAL
MC_CV_MDC_IDC_ZONE_ID: 2
MC_CV_MDC_IDC_ZONE_ID: 6
MDC_IDC_MSMT_BATTERY_REMAINING_LONGEVITY: 132 MO
MDC_IDC_MSMT_BATTERY_RRT_TRIGGER: 2.62
MDC_IDC_MSMT_BATTERY_STATUS: NORMAL
MDC_IDC_MSMT_BATTERY_VOLTAGE: 3.18
MDC_IDC_MSMT_LEADCHNL_RA_DTM: NORMAL
MDC_IDC_MSMT_LEADCHNL_RA_IMPEDANCE_VALUE: 418
MDC_IDC_MSMT_LEADCHNL_RA_PACING_THRESHOLD_AMPLITUDE: 0.5
MDC_IDC_MSMT_LEADCHNL_RA_PACING_THRESHOLD_POLARITY: NORMAL
MDC_IDC_MSMT_LEADCHNL_RA_PACING_THRESHOLD_PULSEWIDTH: 0.4
MDC_IDC_MSMT_LEADCHNL_RA_SENSING_INTR_AMPL: 2.75
MDC_IDC_MSMT_LEADCHNL_RV_DTM: NORMAL
MDC_IDC_MSMT_LEADCHNL_RV_IMPEDANCE_VALUE: 399
MDC_IDC_MSMT_LEADCHNL_RV_PACING_THRESHOLD_AMPLITUDE: 0.5
MDC_IDC_MSMT_LEADCHNL_RV_PACING_THRESHOLD_POLARITY: NORMAL
MDC_IDC_MSMT_LEADCHNL_RV_PACING_THRESHOLD_PULSEWIDTH: 0.4
MDC_IDC_MSMT_LEADCHNL_RV_SENSING_INTR_AMPL: 17
MDC_IDC_PG_IMPLANT_DTM: NORMAL
MDC_IDC_PG_MFG: NORMAL
MDC_IDC_PG_MODEL: NORMAL
MDC_IDC_PG_SERIAL: NORMAL
MDC_IDC_PG_TYPE: NORMAL
MDC_IDC_SESS_DTM: NORMAL
MDC_IDC_SESS_TYPE: NORMAL
MDC_IDC_SET_BRADY_AT_MODE_SWITCH_RATE: 176
MDC_IDC_SET_BRADY_LOWRATE: 50
MDC_IDC_SET_BRADY_MAX_SENSOR_RATE: 130
MDC_IDC_SET_BRADY_MAX_TRACKING_RATE: 170
MDC_IDC_SET_BRADY_MODE: NORMAL
MDC_IDC_SET_BRADY_PAV_DELAY: 140
MDC_IDC_SET_BRADY_SAV_DELAY: 120
MDC_IDC_SET_LEADCHNL_RA_PACING_AMPLITUDE: 3.5
MDC_IDC_SET_LEADCHNL_RA_PACING_POLARITY: NORMAL
MDC_IDC_SET_LEADCHNL_RA_PACING_PULSEWIDTH: 0.4
MDC_IDC_SET_LEADCHNL_RA_SENSING_POLARITY: NORMAL
MDC_IDC_SET_LEADCHNL_RA_SENSING_SENSITIVITY: 0.3
MDC_IDC_SET_LEADCHNL_RV_PACING_AMPLITUDE: 3.5
MDC_IDC_SET_LEADCHNL_RV_PACING_POLARITY: NORMAL
MDC_IDC_SET_LEADCHNL_RV_PACING_PULSEWIDTH: 0.4
MDC_IDC_SET_LEADCHNL_RV_SENSING_POLARITY: NORMAL
MDC_IDC_SET_LEADCHNL_RV_SENSING_SENSITIVITY: 4
MDC_IDC_SET_ZONE_STATUS: NORMAL
MDC_IDC_SET_ZONE_STATUS: NORMAL
MDC_IDC_SET_ZONE_TYPE: NORMAL
MDC_IDC_SET_ZONE_TYPE: NORMAL
MDC_IDC_STAT_AT_BURDEN_PERCENT: 0
MDC_IDC_STAT_BRADY_RA_PERCENT_PACED: 0.4
MDC_IDC_STAT_BRADY_RV_PERCENT_PACED: 99.89

## (undated) DEVICE — INTRO SHEATH PRELUDE SNAP .038 7F 13CM W/SDPRT

## (undated) DEVICE — Device

## (undated) DEVICE — IMPLANTABLE DEVICE
Type: IMPLANTABLE DEVICE | Status: NON-FUNCTIONAL
Removed: 2025-04-22

## (undated) DEVICE — EXOFIN PRECISION PEN HIGH VISCOSITY TOPICAL SKIN ADHESIVE: Brand: EXOFIN PRECISION PEN, 1G

## (undated) DEVICE — CATH DEFLECT SELECTSITE 9F 43CM W/ART HNDL

## (undated) DEVICE — INTRO SHEATH PRELUDE SNAP .038 9F 13CM W/SDPRT BLK

## (undated) DEVICE — GW INQWIRE FC PTFE J/3MM .035 180

## (undated) DEVICE — INTRO SHEATH PRELUDE SNAP .038 8F 13CM W/SDPRT

## (undated) DEVICE — SLITTER CATH GUIDE ATTAIN ADJ

## (undated) DEVICE — LOU PACE DEFIB: Brand: MEDLINE INDUSTRIES, INC.

## (undated) DEVICE — THE PHOTONBLADE WITH ADAPTIVE SMOKE EVACUATION IS A MONOPOLAR RF DEVICE COUPLED WITH ILLUMINATION THAT IS INDICATED FOR CUTTING AND COAGULATION OF TISSUE DURING GENERAL SURGICAL PROCEDURES AND FOR REMOVING SMOKE GENERATED BY ELECTROSURGERY WHEN USED IN CONJUNCTION WITH AN EFFECTIVE SMOKE EVACUATION SYSTEM.: Brand: PHOTONBLADE WITH ADAPTIVE SMOKE EVACUATION

## (undated) DEVICE — CATH GUIDE RIGHTSITE C315HIS-02